# Patient Record
Sex: FEMALE | Race: WHITE | NOT HISPANIC OR LATINO | Employment: UNEMPLOYED | URBAN - NONMETROPOLITAN AREA
[De-identification: names, ages, dates, MRNs, and addresses within clinical notes are randomized per-mention and may not be internally consistent; named-entity substitution may affect disease eponyms.]

---

## 2017-10-05 ENCOUNTER — HOSPITAL ENCOUNTER (EMERGENCY)
Facility: HOSPITAL | Age: 37
Discharge: STILL ADMITTED OR ADMINISTRATIVE | End: 2017-10-06
Attending: EMERGENCY MEDICINE

## 2017-10-05 VITALS
OXYGEN SATURATION: 100 % | DIASTOLIC BLOOD PRESSURE: 84 MMHG | HEART RATE: 82 BPM | BODY MASS INDEX: 25.61 KG/M2 | TEMPERATURE: 98.1 F | HEIGHT: 64 IN | RESPIRATION RATE: 18 BRPM | WEIGHT: 150 LBS | SYSTOLIC BLOOD PRESSURE: 126 MMHG

## 2017-10-05 DIAGNOSIS — F19.10 SUBSTANCE ABUSE (HCC): Primary | ICD-10-CM

## 2017-10-06 ENCOUNTER — HOSPITAL ENCOUNTER (INPATIENT)
Facility: HOSPITAL | Age: 37
LOS: 5 days | Discharge: HOME | End: 2017-10-11
Attending: PSYCHIATRY & NEUROLOGY | Admitting: PSYCHIATRY & NEUROLOGY

## 2017-10-06 PROBLEM — F43.10 POST TRAUMATIC STRESS DISORDER (PTSD): Status: ACTIVE | Noted: 2017-10-06

## 2017-10-06 PROBLEM — F13.20 SEDATIVE, HYPNOTIC, OR ANXIOLYTIC DEPENDENCE (HCC): Status: ACTIVE | Noted: 2017-10-06

## 2017-10-06 PROBLEM — F15.20 METHAMPHETAMINE DEPENDENCE (HCC): Status: ACTIVE | Noted: 2017-10-06

## 2017-10-06 PROBLEM — F19.20 ADDICTION TO DRUG (HCC): Status: ACTIVE | Noted: 2017-10-06

## 2017-10-06 PROBLEM — F11.20 OPIOID TYPE DEPENDENCE, CONTINUOUS (HCC): Status: ACTIVE | Noted: 2017-10-06

## 2017-10-06 PROBLEM — F17.210 DEPENDENCE ON NICOTINE FROM CIGARETTES: Status: ACTIVE | Noted: 2017-10-06

## 2017-10-06 LAB
6-ACETYL MORPHINE: NEGATIVE
ALBUMIN SERPL-MCNC: 3.7 G/DL (ref 3.5–5)
ALBUMIN/GLOB SERPL: 1.2 G/DL (ref 1.5–2.5)
ALP SERPL-CCNC: 82 U/L (ref 35–104)
ALT SERPL W P-5'-P-CCNC: 9 U/L (ref 10–36)
AMPHET+METHAMPHET UR QL: POSITIVE
ANION GAP SERPL CALCULATED.3IONS-SCNC: 5.2 MMOL/L (ref 3.6–11.2)
AST SERPL-CCNC: 16 U/L (ref 10–30)
B-HCG UR QL: NEGATIVE
BACTERIA UR QL AUTO: ABNORMAL /HPF
BARBITURATES UR QL SCN: NEGATIVE
BASOPHILS # BLD AUTO: 0.03 10*3/MM3 (ref 0–0.3)
BASOPHILS NFR BLD AUTO: 0.2 % (ref 0–2)
BENZODIAZ UR QL SCN: NEGATIVE
BILIRUB SERPL-MCNC: 0.1 MG/DL (ref 0.2–1.8)
BILIRUB UR QL STRIP: NEGATIVE
BUN BLD-MCNC: 10 MG/DL (ref 7–21)
BUN/CREAT SERPL: 13.9 (ref 7–25)
BUPRENORPHINE SERPL-MCNC: POSITIVE NG/ML
CALCIUM SPEC-SCNC: 9.2 MG/DL (ref 7.7–10)
CANNABINOIDS SERPL QL: NEGATIVE
CHLORIDE SERPL-SCNC: 106 MMOL/L (ref 99–112)
CLARITY UR: ABNORMAL
CO2 SERPL-SCNC: 29.8 MMOL/L (ref 24.3–31.9)
COCAINE UR QL: NEGATIVE
COLOR UR: YELLOW
CREAT BLD-MCNC: 0.72 MG/DL (ref 0.43–1.29)
DEPRECATED RDW RBC AUTO: 48.2 FL (ref 37–54)
EOSINOPHIL # BLD AUTO: 0.29 10*3/MM3 (ref 0–0.7)
EOSINOPHIL NFR BLD AUTO: 2.1 % (ref 0–5)
ERYTHROCYTE [DISTWIDTH] IN BLOOD BY AUTOMATED COUNT: 14.4 % (ref 11.5–14.5)
ETHANOL BLD-MCNC: <10 MG/DL
ETHANOL UR QL: <0.01 %
GFR SERPL CREATININE-BSD FRML MDRD: 92 ML/MIN/1.73
GLOBULIN UR ELPH-MCNC: 3.1 GM/DL
GLUCOSE BLD-MCNC: 83 MG/DL (ref 70–110)
GLUCOSE UR STRIP-MCNC: NEGATIVE MG/DL
HAV IGM SERPL QL IA: ABNORMAL
HBV CORE IGM SERPL QL IA: ABNORMAL
HBV SURFACE AG SERPL QL IA: ABNORMAL
HCT VFR BLD AUTO: 37.9 % (ref 37–47)
HCV AB SER DONR QL: REACTIVE
HGB BLD-MCNC: 12.2 G/DL (ref 12–16)
HGB UR QL STRIP.AUTO: ABNORMAL
HIV1+2 AB SER QL: NORMAL
HYALINE CASTS UR QL AUTO: ABNORMAL /LPF
IMM GRANULOCYTES # BLD: 0.02 10*3/MM3 (ref 0–0.03)
IMM GRANULOCYTES NFR BLD: 0.1 % (ref 0–0.5)
KETONES UR QL STRIP: NEGATIVE
LEUKOCYTE ESTERASE UR QL STRIP.AUTO: ABNORMAL
LYMPHOCYTES # BLD AUTO: 4.29 10*3/MM3 (ref 1–3)
LYMPHOCYTES NFR BLD AUTO: 30.3 % (ref 21–51)
MCH RBC QN AUTO: 30.7 PG (ref 27–33)
MCHC RBC AUTO-ENTMCNC: 32.2 G/DL (ref 33–37)
MCV RBC AUTO: 95.5 FL (ref 80–94)
METHADONE UR QL SCN: NEGATIVE
MONOCYTES # BLD AUTO: 1.05 10*3/MM3 (ref 0.1–0.9)
MONOCYTES NFR BLD AUTO: 7.4 % (ref 0–10)
NEUTROPHILS # BLD AUTO: 8.46 10*3/MM3 (ref 1.4–6.5)
NEUTROPHILS NFR BLD AUTO: 59.9 % (ref 30–70)
NITRITE UR QL STRIP: NEGATIVE
OPIATES UR QL: NEGATIVE
OSMOLALITY SERPL CALC.SUM OF ELEC: 279.4 MOSM/KG (ref 273–305)
OXYCODONE UR QL SCN: NEGATIVE
PCP UR QL SCN: NEGATIVE
PH UR STRIP.AUTO: 6 [PH] (ref 5–8)
PLATELET # BLD AUTO: 351 10*3/MM3 (ref 130–400)
PMV BLD AUTO: 9.3 FL (ref 6–10)
POTASSIUM BLD-SCNC: 3.7 MMOL/L (ref 3.5–5.3)
PROT SERPL-MCNC: 6.8 G/DL (ref 6–8)
PROT UR QL STRIP: ABNORMAL
RBC # BLD AUTO: 3.97 10*6/MM3 (ref 4.2–5.4)
RBC # UR: ABNORMAL /HPF
REF LAB TEST METHOD: ABNORMAL
SODIUM BLD-SCNC: 141 MMOL/L (ref 135–153)
SP GR UR STRIP: 1.02 (ref 1–1.03)
SQUAMOUS #/AREA URNS HPF: ABNORMAL /HPF
UROBILINOGEN UR QL STRIP: ABNORMAL
WBC NRBC COR # BLD: 14.14 10*3/MM3 (ref 4.5–12.5)
WBC UR QL AUTO: ABNORMAL /HPF

## 2017-10-06 PROCEDURE — 80307 DRUG TEST PRSMV CHEM ANLYZR: CPT | Performed by: PHYSICIAN ASSISTANT

## 2017-10-06 PROCEDURE — 99223 1ST HOSP IP/OBS HIGH 75: CPT | Performed by: PSYCHIATRY & NEUROLOGY

## 2017-10-06 PROCEDURE — 87147 CULTURE TYPE IMMUNOLOGIC: CPT | Performed by: PHYSICIAN ASSISTANT

## 2017-10-06 PROCEDURE — G0432 EIA HIV-1/HIV-2 SCREEN: HCPCS | Performed by: PHYSICIAN ASSISTANT

## 2017-10-06 PROCEDURE — 87077 CULTURE AEROBIC IDENTIFY: CPT | Performed by: PHYSICIAN ASSISTANT

## 2017-10-06 PROCEDURE — 81001 URINALYSIS AUTO W/SCOPE: CPT | Performed by: PHYSICIAN ASSISTANT

## 2017-10-06 PROCEDURE — 80074 ACUTE HEPATITIS PANEL: CPT | Performed by: PHYSICIAN ASSISTANT

## 2017-10-06 PROCEDURE — 81025 URINE PREGNANCY TEST: CPT | Performed by: PHYSICIAN ASSISTANT

## 2017-10-06 PROCEDURE — 87186 SC STD MICRODIL/AGAR DIL: CPT | Performed by: PHYSICIAN ASSISTANT

## 2017-10-06 PROCEDURE — HZ2ZZZZ DETOXIFICATION SERVICES FOR SUBSTANCE ABUSE TREATMENT: ICD-10-PCS | Performed by: PSYCHIATRY & NEUROLOGY

## 2017-10-06 PROCEDURE — 85025 COMPLETE CBC W/AUTO DIFF WBC: CPT | Performed by: PHYSICIAN ASSISTANT

## 2017-10-06 PROCEDURE — 80053 COMPREHEN METABOLIC PANEL: CPT | Performed by: PHYSICIAN ASSISTANT

## 2017-10-06 PROCEDURE — 93005 ELECTROCARDIOGRAM TRACING: CPT | Performed by: PSYCHIATRY & NEUROLOGY

## 2017-10-06 PROCEDURE — 87086 URINE CULTURE/COLONY COUNT: CPT | Performed by: PHYSICIAN ASSISTANT

## 2017-10-06 RX ORDER — CLONIDINE HYDROCHLORIDE 0.1 MG/1
0.1 TABLET ORAL 4 TIMES DAILY PRN
Status: DISCONTINUED | OUTPATIENT
Start: 2017-10-07 | End: 2017-10-07

## 2017-10-06 RX ORDER — BENZTROPINE MESYLATE 1 MG/1
1 TABLET ORAL DAILY PRN
Status: DISCONTINUED | OUTPATIENT
Start: 2017-10-06 | End: 2017-10-11 | Stop reason: HOSPADM

## 2017-10-06 RX ORDER — NITROFURANTOIN 25; 75 MG/1; MG/1
100 CAPSULE ORAL EVERY 12 HOURS SCHEDULED
Status: COMPLETED | OUTPATIENT
Start: 2017-10-06 | End: 2017-10-10

## 2017-10-06 RX ORDER — DICYCLOMINE HYDROCHLORIDE 10 MG/1
10 CAPSULE ORAL 3 TIMES DAILY PRN
Status: DISPENSED | OUTPATIENT
Start: 2017-10-06 | End: 2017-10-11

## 2017-10-06 RX ORDER — CYCLOBENZAPRINE HCL 10 MG
10 TABLET ORAL 3 TIMES DAILY PRN
Status: DISPENSED | OUTPATIENT
Start: 2017-10-06 | End: 2017-10-11

## 2017-10-06 RX ORDER — FAMOTIDINE 20 MG/1
20 TABLET, FILM COATED ORAL 2 TIMES DAILY PRN
Status: DISCONTINUED | OUTPATIENT
Start: 2017-10-06 | End: 2017-10-11 | Stop reason: HOSPADM

## 2017-10-06 RX ORDER — LORAZEPAM 1 MG/1
1 TABLET ORAL EVERY 4 HOURS PRN
Status: ACTIVE | OUTPATIENT
Start: 2017-10-09 | End: 2017-10-10

## 2017-10-06 RX ORDER — LORAZEPAM 2 MG/1
2 TABLET ORAL EVERY 4 HOURS PRN
Status: ACTIVE | OUTPATIENT
Start: 2017-10-07 | End: 2017-10-08

## 2017-10-06 RX ORDER — ONDANSETRON 4 MG/1
4 TABLET, FILM COATED ORAL EVERY 6 HOURS PRN
Status: DISCONTINUED | OUTPATIENT
Start: 2017-10-06 | End: 2017-10-11 | Stop reason: HOSPADM

## 2017-10-06 RX ORDER — HYDROXYZINE 50 MG/1
50 TABLET, FILM COATED ORAL EVERY 6 HOURS PRN
Status: DISCONTINUED | OUTPATIENT
Start: 2017-10-06 | End: 2017-10-11 | Stop reason: HOSPADM

## 2017-10-06 RX ORDER — LORAZEPAM 0.5 MG/1
0.5 TABLET ORAL
Status: COMPLETED | OUTPATIENT
Start: 2017-10-10 | End: 2017-10-10

## 2017-10-06 RX ORDER — ACETAMINOPHEN 325 MG/1
650 TABLET ORAL EVERY 4 HOURS PRN
Status: DISCONTINUED | OUTPATIENT
Start: 2017-10-06 | End: 2017-10-11 | Stop reason: HOSPADM

## 2017-10-06 RX ORDER — CLONIDINE HYDROCHLORIDE 0.1 MG/1
0.1 TABLET ORAL 3 TIMES DAILY PRN
Status: DISCONTINUED | OUTPATIENT
Start: 2017-10-08 | End: 2017-10-07

## 2017-10-06 RX ORDER — CLONIDINE HYDROCHLORIDE 0.1 MG/1
0.1 TABLET ORAL 4 TIMES DAILY PRN
Status: ACTIVE | OUTPATIENT
Start: 2017-10-06 | End: 2017-10-07

## 2017-10-06 RX ORDER — MULTIVITAMIN
1 TABLET ORAL DAILY
Status: DISCONTINUED | OUTPATIENT
Start: 2017-10-06 | End: 2017-10-11 | Stop reason: HOSPADM

## 2017-10-06 RX ORDER — CLONIDINE HYDROCHLORIDE 0.1 MG/1
0.1 TABLET ORAL ONCE AS NEEDED
Status: DISCONTINUED | OUTPATIENT
Start: 2017-10-10 | End: 2017-10-07

## 2017-10-06 RX ORDER — SULFAMETHOXAZOLE AND TRIMETHOPRIM 800; 160 MG/1; MG/1
1 TABLET ORAL ONCE
Status: COMPLETED | OUTPATIENT
Start: 2017-10-06 | End: 2017-10-06

## 2017-10-06 RX ORDER — LORAZEPAM 0.5 MG/1
0.5 TABLET ORAL EVERY 4 HOURS PRN
Status: ACTIVE | OUTPATIENT
Start: 2017-10-10 | End: 2017-10-11

## 2017-10-06 RX ORDER — CLONIDINE HYDROCHLORIDE 0.1 MG/1
0.1 TABLET ORAL 2 TIMES DAILY PRN
Status: DISCONTINUED | OUTPATIENT
Start: 2017-10-09 | End: 2017-10-07

## 2017-10-06 RX ORDER — ECHINACEA PURPUREA EXTRACT 125 MG
2 TABLET ORAL AS NEEDED
Status: DISCONTINUED | OUTPATIENT
Start: 2017-10-06 | End: 2017-10-11 | Stop reason: HOSPADM

## 2017-10-06 RX ORDER — BENZTROPINE MESYLATE 1 MG/ML
0.5 INJECTION INTRAMUSCULAR; INTRAVENOUS DAILY PRN
Status: DISCONTINUED | OUTPATIENT
Start: 2017-10-06 | End: 2017-10-11 | Stop reason: HOSPADM

## 2017-10-06 RX ORDER — LORAZEPAM 1 MG/1
1 TABLET ORAL
Status: COMPLETED | OUTPATIENT
Start: 2017-10-09 | End: 2017-10-09

## 2017-10-06 RX ORDER — TRAZODONE HYDROCHLORIDE 50 MG/1
50 TABLET ORAL NIGHTLY PRN
Status: DISCONTINUED | OUTPATIENT
Start: 2017-10-06 | End: 2017-10-11 | Stop reason: HOSPADM

## 2017-10-06 RX ORDER — ALUMINA, MAGNESIA, AND SIMETHICONE 2400; 2400; 240 MG/30ML; MG/30ML; MG/30ML
15 SUSPENSION ORAL EVERY 6 HOURS PRN
Status: DISCONTINUED | OUTPATIENT
Start: 2017-10-06 | End: 2017-10-11 | Stop reason: HOSPADM

## 2017-10-06 RX ORDER — LORAZEPAM 2 MG/1
2 TABLET ORAL
Status: ACTIVE | OUTPATIENT
Start: 2017-10-06 | End: 2017-10-07

## 2017-10-06 RX ORDER — NICOTINE 21 MG/24HR
1 PATCH, TRANSDERMAL 24 HOURS TRANSDERMAL EVERY 24 HOURS
Status: DISCONTINUED | OUTPATIENT
Start: 2017-10-06 | End: 2017-10-11 | Stop reason: HOSPADM

## 2017-10-06 RX ORDER — BENZONATATE 100 MG/1
100 CAPSULE ORAL 3 TIMES DAILY PRN
Status: DISCONTINUED | OUTPATIENT
Start: 2017-10-06 | End: 2017-10-11 | Stop reason: HOSPADM

## 2017-10-06 RX ORDER — LOPERAMIDE HYDROCHLORIDE 2 MG/1
2 CAPSULE ORAL 4 TIMES DAILY PRN
Status: DISCONTINUED | OUTPATIENT
Start: 2017-10-06 | End: 2017-10-11 | Stop reason: HOSPADM

## 2017-10-06 RX ORDER — LORAZEPAM 2 MG/1
2 TABLET ORAL
Status: COMPLETED | OUTPATIENT
Start: 2017-10-07 | End: 2017-10-07

## 2017-10-06 RX ADMIN — TRAZODONE HYDROCHLORIDE 50 MG: 50 TABLET ORAL at 22:30

## 2017-10-06 RX ADMIN — NITROFURANTOIN MONOHYDRATE/MACROCRYSTALLINE 100 MG: 25; 75 CAPSULE ORAL at 14:00

## 2017-10-06 RX ADMIN — NITROFURANTOIN MONOHYDRATE/MACROCRYSTALLINE 100 MG: 25; 75 CAPSULE ORAL at 22:30

## 2017-10-06 RX ADMIN — Medication 1 TABLET: at 08:45

## 2017-10-06 RX ADMIN — CYCLOBENZAPRINE HYDROCHLORIDE 10 MG: 10 TABLET, FILM COATED ORAL at 22:30

## 2017-10-06 RX ADMIN — HYDROXYZINE HYDROCHLORIDE 50 MG: 50 TABLET ORAL at 22:30

## 2017-10-06 RX ADMIN — ACETAMINOPHEN 650 MG: 325 TABLET ORAL at 02:11

## 2017-10-06 RX ADMIN — SULFAMETHOXAZOLE AND TRIMETHOPRIM 160 MG: 800; 160 TABLET ORAL at 01:27

## 2017-10-06 NOTE — PLAN OF CARE
Problem: BH Patient Care Overview (Adult)  Goal: Plan of Care Review  Outcome: Ongoing (interventions implemented as appropriate)    10/06/17 0336   Coping/Psychosocial Response Interventions   Plan Of Care Reviewed With patient   Coping/Psychosocial   Patient Agreement with Plan of Care agrees   Patient Care Overview   Progress no change   Outcome Evaluation   Outcome Summary/Follow up Plan New admit to the facility
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
## 2017-10-06 NOTE — PLAN OF CARE
Problem: BH Patient Care Overview (Adult)  Goal: Plan of Care Review  Outcome: Ongoing (interventions implemented as appropriate)    10/06/17 4947   Coping/Psychosocial Response Interventions   Plan Of Care Reviewed With patient   Coping/Psychosocial   Patient Agreement with Plan of Care agrees   Patient Care Overview   Progress no change   Outcome Evaluation   Outcome Summary/Follow up Plan pt staying in bed a lot.
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
## 2017-10-06 NOTE — DISCHARGE INSTR - APPOINTMENTS
[address] [address]  [phone]    October 16th, 2017 @ 1:45am     Additional Community Resources:    [facility]   [facility] [address] [place] [address] [address] [place] [id number] [phone]  Mondays at 6:00PM  (meals and childcare provided)    Local NA Meeting  [facility]   [address] [place] [address]  Fridays at 7:00PM

## 2017-10-06 NOTE — ED PROVIDER NOTES
Subjective   Patient is a 37 y.o. female presenting with mental health disorder.   History provided by:  Patient  Mental Health Problem   Presenting symptoms: depression    Degree of incapacity (severity):  Moderate  Onset quality:  Gradual  Timing:  Constant  Progression:  Worsening  Chronicity:  Recurrent  Context: drug abuse    Treatment compliance:  Some of the time  Relieved by:  Nothing  Associated symptoms: anxiety, appetite change, feelings of worthlessness and poor judgment    Associated symptoms: no abdominal pain, no anhedonia and no chest pain        Review of Systems   Constitutional: Positive for appetite change. Negative for fever.   HENT: Negative.    Respiratory: Negative.    Cardiovascular: Negative.  Negative for chest pain.   Gastrointestinal: Negative.  Negative for abdominal pain.   Endocrine: Negative.    Genitourinary: Negative.  Negative for dysuria.   Skin: Negative.    Neurological: Negative.    Psychiatric/Behavioral: The patient is nervous/anxious.    All other systems reviewed and are negative.      Past Medical History:   Diagnosis Date   • Withdrawal symptoms, drug or narcotic        No Known Allergies    Past Surgical History:   Procedure Laterality Date   • APPENDECTOMY     •  SECTION         History reviewed. No pertinent family history.    Social History     Social History   • Marital status: Single     Spouse name: N/A   • Number of children: N/A   • Years of education: N/A     Social History Main Topics   • Smoking status: Current Some Day Smoker     Packs/day: 1.50   • Smokeless tobacco: Never Used   • Alcohol use No   • Drug use: Yes     Special: Methamphetamines   • Sexual activity: Defer     Other Topics Concern   • None     Social History Narrative   • None           Objective   Physical Exam   Constitutional: She is oriented to person, place, and time. She appears well-developed and well-nourished. No distress.   HENT:   Head: Normocephalic and atraumatic.   Nose:  Nose normal.   Eyes: Conjunctivae and EOM are normal. Pupils are equal, round, and reactive to light.   Neck: Normal range of motion. Neck supple. No JVD present. No tracheal deviation present.   Cardiovascular: Normal rate, regular rhythm and normal heart sounds.    No murmur heard.  Pulmonary/Chest: Effort normal and breath sounds normal. No respiratory distress. She has no wheezes.   Abdominal: Soft. Bowel sounds are normal. There is no tenderness.   Musculoskeletal: Normal range of motion. She exhibits no edema or deformity.   Neurological: She is alert and oriented to person, place, and time. No cranial nerve deficit.   Skin: Skin is warm and dry. No rash noted. She is not diaphoretic. No erythema. No pallor.   Nursing note and vitals reviewed.      Procedures         ED Course  ED Course                  MDM  Number of Diagnoses or Management Options  established and worsening     Amount and/or Complexity of Data Reviewed  Clinical lab tests: ordered and reviewed    Risk of Complications, Morbidity, and/or Mortality  Presenting problems: moderate  Diagnostic procedures: moderate  Management options: moderate    Patient Progress  Patient progress: stable      Final diagnoses:   Substance abuse            GERALD Hutchins  10/06/17 0137

## 2017-10-07 PROBLEM — N30.00 ACUTE CYSTITIS WITHOUT HEMATURIA: Status: ACTIVE | Noted: 2017-10-07

## 2017-10-07 PROBLEM — F11.23 OPIOID DEPENDENCE WITH WITHDRAWAL (HCC): Status: ACTIVE | Noted: 2017-10-06

## 2017-10-07 PROCEDURE — 99232 SBSQ HOSP IP/OBS MODERATE 35: CPT | Performed by: PSYCHIATRY & NEUROLOGY

## 2017-10-07 RX ORDER — BUPRENORPHINE 2 MG/1
4 TABLET SUBLINGUAL ONCE
Status: COMPLETED | OUTPATIENT
Start: 2017-10-07 | End: 2017-10-07

## 2017-10-07 RX ORDER — BUPRENORPHINE 2 MG/1
2 TABLET SUBLINGUAL EVERY 12 HOURS
Status: COMPLETED | OUTPATIENT
Start: 2017-10-08 | End: 2017-10-09

## 2017-10-07 RX ADMIN — ACETAMINOPHEN 650 MG: 325 TABLET ORAL at 09:14

## 2017-10-07 RX ADMIN — LORAZEPAM 2 MG: 2 TABLET ORAL at 21:49

## 2017-10-07 RX ADMIN — BUPRENORPHINE HCL 4 MG: 2 TABLET SUBLINGUAL at 14:01

## 2017-10-07 RX ADMIN — NITROFURANTOIN MONOHYDRATE/MACROCRYSTALLINE 100 MG: 25; 75 CAPSULE ORAL at 21:49

## 2017-10-07 RX ADMIN — LORAZEPAM 2 MG: 2 TABLET ORAL at 14:01

## 2017-10-07 RX ADMIN — LORAZEPAM 2 MG: 2 TABLET ORAL at 09:14

## 2017-10-07 RX ADMIN — TRAZODONE HYDROCHLORIDE 50 MG: 50 TABLET ORAL at 21:51

## 2017-10-07 RX ADMIN — NITROFURANTOIN MONOHYDRATE/MACROCRYSTALLINE 100 MG: 25; 75 CAPSULE ORAL at 09:13

## 2017-10-07 RX ADMIN — Medication 1 TABLET: at 09:13

## 2017-10-07 NOTE — PLAN OF CARE
Problem:  Patient Care Overview (Adult)  Goal: Plan of Care Review  Outcome: Ongoing (interventions implemented as appropriate)  Patient has been napping most of the day because she only slept only a few hours last night because her legs were aching.  Cravings 9 for suboxone, anxiety 10, depression 7, having some tremors, headaches and a lot of restlessness noted.  Denied any S/I, H/I & A/V/H ideations.    10/07/17 4589   Coping/Psychosocial Response Interventions   Plan Of Care Reviewed With patient   Coping/Psychosocial   Patient Agreement with Plan of Care agrees   Patient Care Overview   Progress improving

## 2017-10-07 NOTE — PROGRESS NOTES
"INPATIENT PSYCHIATRIC PROGRESS NOTE    Name:  Joy Hernandez  :  1980  MRN:  [id number]  Visit Number:  [id number]  Length of stay:  1    SUBJECTIVE  CC: polysubstance withdrawal    INTERVAL HISTORY:  The patient reports that she feels like \"shit.\"  She says she has been using meth to come off of the Xanax.  She also has been using Suboxone.  Today, she has significant opiate withdrawal symptoms.  She denies any suicidal or homicidal thoughts.  She denied any perceptual disturbances including auditory or visual hallucinations and no tactile hallucinations.  Depression rating 6/10  Anxiety rating 10/10  Sleep: poor, restless  Withdrawal sx: see ROS below  Craving: 10/10, for \"suboxone\"      Review of Systems   Constitutional: Positive for chills, diaphoresis and fatigue.   Respiratory: Negative.    Cardiovascular: Negative.         C/o \"rib pain\" on right   Gastrointestinal: Positive for diarrhea and nausea. Negative for vomiting.   Musculoskeletal: Positive for myalgias.   Neurological: Positive for headaches.       OBJECTIVE    Temp:  [98.4 °F (36.9 °C)-99.8 °F (37.7 °C)] 98.4 °F (36.9 °C)  Heart Rate:  [85-94] 88  Resp:  [18] 18  BP: (106-135)/(61-96) 135/96    MENTAL STATUS EXAM:  Appearance: Disheveled, appeared in mild distress  Cooperation:Cooperative, poor eye contact  Psychomotor: No psychomotor agitation/retardation, No EPS, No motor tics  Speech-normal rate, amount.  Mood \"terrible\"   Affect-congruent, appropriate, stable  Thought Content-goal directed, no delusional material present  Thought process-linear, organized.  Suicidality: No SI  Homicidality: No HI  Perception: No AH/VH  Insight- poor   Judgement-fair    Lab Results (last 24 hours)     ** No results found for the last 24 hours. **             Imaging Results (last 24 hours)     ** No results found for the last 24 hours. **             ECG/EMG Results (most recent)     Procedure Component Value Units Date/Time    ECG 12 Lead [[id number]] " Collected:  10/06/17 0443     Updated:  10/06/17 1736    Narrative:       Test Reason : ROUTINE  Blood Pressure : **/** mmHG  Vent. Rate : 089 BPM     Atrial Rate : 089 BPM     P-R Int : 150 ms          QRS Dur : 082 ms      QT Int : 392 ms       P-R-T Axes : 050 076 065 degrees     QTc Int : 476 ms    Normal sinus rhythm  Possible Left atrial enlargement  Borderline ECG  No previous ECGs available  Confirmed by Danae Cervantes ([id number]) on 10/6/2017 5:36:52 PM    Referred By:  ANANYA           Confirmed By:Danae Cervantes           ALLERGIES: Review of patient's allergies indicates no known allergies.      Current Facility-Administered Medications:   •  acetaminophen (TYLENOL) tablet 650 mg, 650 mg, Oral, Q4H PRN, Raquel Manzano MD, 650 mg at 10/07/17 0914  •  aluminum-magnesium hydroxide-simethicone (MAALOX MAX) 400-400-40 MG/5ML suspension 15 mL, 15 mL, Oral, Q6H PRN, Raquel Manzano MD  •  benzonatate (TESSALON) capsule 100 mg, 100 mg, Oral, TID PRN, Raquel Manzano MD  •  benztropine (COGENTIN) tablet 1 mg, 1 mg, Oral, Daily PRN **OR** benztropine (COGENTIN) injection 0.5 mg, 0.5 mg, Intramuscular, Daily PRN, Raquel Manzano MD  •  [START ON 10/8/2017] buprenorphine (SUBUTEX) SL tablet 2 mg, 2 mg, Sublingual, Q12H, Taqueria Bolden MD  •  buprenorphine (SUBUTEX) SL tablet 4 mg, 4 mg, Sublingual, Once, Taqueria Bolden MD  •  cyclobenzaprine (FLEXERIL) tablet 10 mg, 10 mg, Oral, TID PRN, Raquel Manzano MD, 10 mg at 10/06/17 2230  •  dicyclomine (BENTYL) capsule 10 mg, 10 mg, Oral, TID PRN, Raquel Manzano MD  •  famotidine (PEPCID) tablet 20 mg, 20 mg, Oral, BID PRN, Raquel Manzano MD  •  hydrOXYzine (ATARAX) tablet 50 mg, 50 mg, Oral, Q6H PRN, Raquel Manzano MD, 50 mg at 10/06/17 2230  •  loperamide (IMODIUM) capsule 2 mg, 2 mg, Oral, 4x Daily PRN, Raquel Manzano MD  •  LORazepam (ATIVAN) tablet 2 mg, 2 mg, Oral, 3 times per day, 2 mg at 10/07/17 0914 **FOLLOWED BY** [START ON 10/8/2017] LORazepam (ATIVAN) tablet  1.5 mg, 1.5 mg, Oral, 3 times per day **FOLLOWED BY** [START ON 10/9/2017] LORazepam (ATIVAN) tablet 1 mg, 1 mg, Oral, 3 times per day **FOLLOWED BY** [START ON 10/10/2017] LORazepam (ATIVAN) tablet 0.5 mg, 0.5 mg, Oral, 3 times per day, Raquel Manzano MD  •  LORazepam (ATIVAN) tablet 2 mg, 2 mg, Oral, Q4H PRN **FOLLOWED BY** [START ON 10/8/2017] LORazepam (ATIVAN) tablet 1.5 mg, 1.5 mg, Oral, Q4H PRN **FOLLOWED BY** [START ON 10/9/2017] LORazepam (ATIVAN) tablet 1 mg, 1 mg, Oral, Q4H PRN **FOLLOWED BY** [START ON 10/10/2017] LORazepam (ATIVAN) tablet 0.5 mg, 0.5 mg, Oral, Q4H PRN, Raquel Manzano MD  •  magnesium hydroxide (MILK OF MAGNESIA) suspension 2400 mg/10mL 10 mL, 10 mL, Oral, Daily PRN, Raquel Manzano MD  •  multivitamin (DAILY ISIS) tablet 1 tablet, 1 tablet, Oral, Daily, Raquel Manzano MD, 1 tablet at 10/07/17 0913  •  nicotine (NICODERM CQ) 21 MG/24HR patch 1 patch, 1 patch, Transdermal, Q24H, Raquel Manzano MD  •  nitrofurantoin (macrocrystal-monohydrate) (MACROBID) capsule 100 mg, 100 mg, Oral, Q12H, Raquel Manzano MD, 100 mg at 10/07/17 0913  •  ondansetron (ZOFRAN) tablet 4 mg, 4 mg, Oral, Q6H PRN, Raquel Manzano MD  •  sodium chloride (OCEAN) nasal spray 2 spray, 2 spray, Each Nare, PRN, Raquel Manzano MD  •  traZODone (DESYREL) tablet 50 mg, 50 mg, Oral, Nightly PRN, Raquel Manzano MD, 50 mg at 10/06/17 2230    ASSESSMENT & PLAN:    Active Problems:    Opioid dependence with withdrawal  Plan: Start a short Subutex taper today.  Continue supportive treatment of withdrawal      Sedative, hypnotic, or anxiolytic dependence  Plan: Continue Ativan detox protocol.      Methamphetamine dependence  Plan: Monitor for withdrawal and treat supportively      Dependence on nicotine from cigarettes  Plan: Continue nicotine replacement      Post traumatic stress disorder (PTSD)  Plan: Continue monitoring and trazodone as needed for sleep      Acute cystitis without hematuria  Plan: Continue Macrobid and follow up on the  urine culture and sensitivity report        Behavioral Health Treatment Plan and Problem List: I have reviewed and approved the Behavioral Health Treatment Plan and Problem list.  The patient has had a chance to review and agrees with the treatment plan.     Clinician:  Taqueria Bolden MD  10/07/17  1:19 PM

## 2017-10-07 NOTE — PLAN OF CARE
Problem: BH Overarching Goals  Goal: Adheres to Safety Considerations for Self and Others  Outcome: Ongoing (interventions implemented as appropriate)  Goal: Optimized Coping Skills in Response to Life Stressors  Outcome: Ongoing (interventions implemented as appropriate)  Goal: Develops/Participates in Therapeutic [place] to Support Successful Transition  Outcome: Ongoing (interventions implemented as appropriate)

## 2017-10-08 LAB
BACTERIA SPEC AEROBE CULT: ABNORMAL
BACTERIA SPEC AEROBE CULT: ABNORMAL

## 2017-10-08 PROCEDURE — 99232 SBSQ HOSP IP/OBS MODERATE 35: CPT | Performed by: PSYCHIATRY & NEUROLOGY

## 2017-10-08 RX ORDER — ROPINIROLE 0.25 MG/1
0.25 TABLET, FILM COATED ORAL EVERY 8 HOURS SCHEDULED
Status: DISCONTINUED | OUTPATIENT
Start: 2017-10-08 | End: 2017-10-11 | Stop reason: HOSPADM

## 2017-10-08 RX ORDER — DOCUSATE SODIUM 100 MG/1
100 CAPSULE, LIQUID FILLED ORAL 2 TIMES DAILY PRN
Status: DISCONTINUED | OUTPATIENT
Start: 2017-10-08 | End: 2017-10-11 | Stop reason: HOSPADM

## 2017-10-08 RX ADMIN — BUPRENORPHINE HCL 2 MG: 2 TABLET SUBLINGUAL at 08:30

## 2017-10-08 RX ADMIN — LORAZEPAM 1.5 MG: 2 TABLET ORAL at 14:20

## 2017-10-08 RX ADMIN — NITROFURANTOIN MONOHYDRATE/MACROCRYSTALLINE 100 MG: 25; 75 CAPSULE ORAL at 20:31

## 2017-10-08 RX ADMIN — ROPINIROLE 0.25 MG: 0.25 TABLET ORAL at 21:57

## 2017-10-08 RX ADMIN — ONDANSETRON 4 MG: 4 TABLET, FILM COATED ORAL at 20:31

## 2017-10-08 RX ADMIN — BUPRENORPHINE HCL 2 MG: 2 TABLET SUBLINGUAL at 20:31

## 2017-10-08 RX ADMIN — LORAZEPAM 1.5 MG: 2 TABLET ORAL at 08:30

## 2017-10-08 RX ADMIN — NITROFURANTOIN MONOHYDRATE/MACROCRYSTALLINE 100 MG: 25; 75 CAPSULE ORAL at 08:30

## 2017-10-08 RX ADMIN — Medication 1 TABLET: at 08:30

## 2017-10-08 RX ADMIN — ACETAMINOPHEN 650 MG: 325 TABLET ORAL at 20:31

## 2017-10-08 RX ADMIN — LORAZEPAM 1.5 MG: 2 TABLET ORAL at 21:57

## 2017-10-08 RX ADMIN — ROPINIROLE 0.25 MG: 0.25 TABLET ORAL at 17:11

## 2017-10-08 NOTE — PROGRESS NOTES
"INPATIENT PSYCHIATRIC PROGRESS NOTE    Name:  Joy Hernandez  :  1980  MRN:  [id number]  Visit Number:  [id number]  Length of stay:  2    SUBJECTIVE  CC: polysubstance withdrawal    INTERVAL HISTORY:  Patient continues to report that she is doing poorly.  She has been isolating in her room.    She denied auditory or visual hallucinations.  Sleep: Intermittent  Withdrawal sx: see ROS below; it should be noted to day that she complained of constipation saying \"I haven't gone since I've been here.\"  whereas yesterday she complained of diarrhea  Craving: 10/10, for \"suboxone\"      Review of Systems   Constitutional: Positive for chills, diaphoresis and fatigue.   Respiratory: Negative.    Cardiovascular: Negative.    Gastrointestinal: Positive for diarrhea and nausea. Negative for vomiting.   Musculoskeletal: Positive for myalgias.   Neurological: Positive for headaches.       OBJECTIVE    Temp:  [98.4 °F (36.9 °C)-99.2 °F (37.3 °C)] 99.2 °F (37.3 °C)  Heart Rate:  [] 113  Resp:  [16-20] 16  BP: (118-125)/(71-82) 118/71    MENTAL STATUS EXAM:  Appearance: Disheveled,  but no longer appears to be in distress  Cooperation:Cooperative, poor eye contact  Psychomotor: No psychomotor agitation/retardation, No EPS, No motor tics  Speech-normal rate, amount.  Mood \"terrible\"   Affect- constricted  Thought Content-goal directed, no delusional material present  Thought process-linear, organized.  Suicidality: No SI  Homicidality: No HI  Perception: No AH/VH  Insight- poor   Judgement-fair    Lab Results (last 24 hours)     ** No results found for the last 24 hours. **             Imaging Results (last 24 hours)     ** No results found for the last 24 hours. **             ECG/EMG Results (most recent)     Procedure Component Value Units Date/Time    ECG 12 Lead [[id number]] Collected:  10/06/17 0443     Updated:  10/06/17 391    Narrative:       Test Reason : ROUTINE  Blood Pressure : **/** mmHG  Vent. Rate : 089 " BPM     Atrial Rate : 089 BPM     P-R Int : 150 ms          QRS Dur : 082 ms      QT Int : 392 ms       P-R-T Axes : 050 076 065 degrees     QTc Int : 476 ms    Normal sinus rhythm  Possible Left atrial enlargement  Borderline ECG  No previous ECGs available  Confirmed by Danae Cervantes ([id number]) on 10/6/2017 5:36:52 PM    Referred By:  ANANYA           Confirmed By:Danae Cervantes           ALLERGIES: Review of patient's allergies indicates no known allergies.      Current Facility-Administered Medications:   •  acetaminophen (TYLENOL) tablet 650 mg, 650 mg, Oral, Q4H PRN, Raquel Manzano MD, 650 mg at 10/07/17 0914  •  aluminum-magnesium hydroxide-simethicone (MAALOX MAX) 400-400-40 MG/5ML suspension 15 mL, 15 mL, Oral, Q6H PRN, Raquel Manzano MD  •  benzonatate (TESSALON) capsule 100 mg, 100 mg, Oral, TID PRN, Raquel Manzano MD  •  benztropine (COGENTIN) tablet 1 mg, 1 mg, Oral, Daily PRN **OR** benztropine (COGENTIN) injection 0.5 mg, 0.5 mg, Intramuscular, Daily PRN, Raquel Manzano MD  •  buprenorphine (SUBUTEX) SL tablet 2 mg, 2 mg, Sublingual, Q12H, Taqueria Bolden MD, 2 mg at 10/08/17 0830  •  cyclobenzaprine (FLEXERIL) tablet 10 mg, 10 mg, Oral, TID PRN, Raquel Manzano MD, 10 mg at 10/06/17 2230  •  dicyclomine (BENTYL) capsule 10 mg, 10 mg, Oral, TID PRN, Raquel Manzano MD  •  famotidine (PEPCID) tablet 20 mg, 20 mg, Oral, BID PRN, Raquel Manzano MD  •  hydrOXYzine (ATARAX) tablet 50 mg, 50 mg, Oral, Q6H PRN, Raquel Manzano MD, 50 mg at 10/06/17 2230  •  loperamide (IMODIUM) capsule 2 mg, 2 mg, Oral, 4x Daily PRN, Raquel Manzano MD  •  [COMPLETED] LORazepam (ATIVAN) tablet 2 mg, 2 mg, Oral, 3 times per day, 2 mg at 10/07/17 2149 **FOLLOWED BY** LORazepam (ATIVAN) tablet 1.5 mg, 1.5 mg, Oral, 3 times per day, 1.5 mg at 10/08/17 1420 **FOLLOWED BY** [START ON 10/9/2017] LORazepam (ATIVAN) tablet 1 mg, 1 mg, Oral, 3 times per day **FOLLOWED BY** [START ON 10/10/2017] LORazepam (ATIVAN) tablet 0.5 mg, 0.5 mg, Oral,  3 times per day, Raquel Manzano MD  •  [] LORazepam (ATIVAN) tablet 2 mg, 2 mg, Oral, Q4H PRN **FOLLOWED BY** LORazepam (ATIVAN) tablet 1.5 mg, 1.5 mg, Oral, Q4H PRN **FOLLOWED BY** [START ON 10/9/2017] LORazepam (ATIVAN) tablet 1 mg, 1 mg, Oral, Q4H PRN **FOLLOWED BY** [START ON 10/10/2017] LORazepam (ATIVAN) tablet 0.5 mg, 0.5 mg, Oral, Q4H PRN, Raquel Manzano MD  •  magnesium hydroxide (MILK OF MAGNESIA) suspension 2400 mg/10mL 10 mL, 10 mL, Oral, Daily PRN, Raquel Manzano MD  •  multivitamin (DAILY ISIS) tablet 1 tablet, 1 tablet, Oral, Daily, Raquel Manzano MD, 1 tablet at 10/08/17 0830  •  nicotine (NICODERM CQ) 21 MG/24HR patch 1 patch, 1 patch, Transdermal, Q24H, Raquel Manzano MD  •  nitrofurantoin (macrocrystal-monohydrate) (MACROBID) capsule 100 mg, 100 mg, Oral, Q12H, Raquel Manzano MD, 100 mg at 10/08/17 0830  •  ondansetron (ZOFRAN) tablet 4 mg, 4 mg, Oral, Q6H PRN, Raquel Manzano MD  •  sodium chloride (OCEAN) nasal spray 2 spray, 2 spray, Each Nare, PRN, Raquel Manzano MD  •  traZODone (DESYREL) tablet 50 mg, 50 mg, Oral, Nightly PRN, Raquel Manzano MD, 50 mg at 10/07/17 2151    ASSESSMENT & PLAN:    Active Problems:    Opioid dependence with withdrawal  Plan: Continue Subutex taper today.  Continue supportive treatment of withdrawal.  We will begin Requip 0.25 mg 3 times a day to help with the leg restlessness.  I'm also adding Colace 100 mg twice daily as needed      Sedative, hypnotic, or anxiolytic dependence  Plan: Continue Ativan detox protocol.      Methamphetamine dependence  Plan: Monitor for withdrawal and treat supportively      Dependence on nicotine from cigarettes  Plan: Continue nicotine replacement      Post traumatic stress disorder (PTSD)  Plan: Continue monitoring and trazodone as needed for sleep      Acute cystitis without hematuria  Plan: Continue Macrobid         Behavioral Health Treatment Plan and Problem List: I have reviewed and approved the Behavioral Health Treatment Plan  and Problem list.  The patient has had a chance to review and agrees with the treatment plan.     Clinician:  Taqueria Bolden MD  10/08/17  2:58 PM

## 2017-10-08 NOTE — PLAN OF CARE
Problem: BH Patient Care Overview (Adult)  Goal: Plan of Care Review  Outcome: Ongoing (interventions implemented as appropriate)    10/08/17 1542   Coping/Psychosocial Response Interventions   Plan Of Care Reviewed With patient   Coping/Psychosocial   Patient Agreement with Plan of Care agrees   Patient Care Overview   Progress no change   Outcome Evaluation   Outcome Summary/Follow up Plan pt staying in bed a lot and somatic.

## 2017-10-08 NOTE — PLAN OF CARE
Problem: BH Patient Care Overview (Adult)  Goal: Plan of Care Review  Outcome: Ongoing (interventions implemented as appropriate)    10/08/17 0650   Coping/Psychosocial Response Interventions   Plan Of Care Reviewed With patient   Coping/Psychosocial   Patient Agreement with Plan of Care agrees   Patient Care Overview   Progress improving         Problem:  Overarching Goals  Goal: Adheres to Safety Considerations for Self and Others  Outcome: Ongoing (interventions implemented as appropriate)  Goal: Optimized Coping Skills in Response to Life Stressors  Outcome: Ongoing (interventions implemented as appropriate)  Goal: Develops/Participates in Therapeutic [place] to Support Successful Transition  Outcome: Ongoing (interventions implemented as appropriate)

## 2017-10-09 PROCEDURE — 99232 SBSQ HOSP IP/OBS MODERATE 35: CPT | Performed by: PSYCHIATRY & NEUROLOGY

## 2017-10-09 RX ADMIN — ROPINIROLE 0.25 MG: 0.25 TABLET ORAL at 13:40

## 2017-10-09 RX ADMIN — ROPINIROLE 0.25 MG: 0.25 TABLET ORAL at 05:39

## 2017-10-09 RX ADMIN — BUPRENORPHINE HCL 2 MG: 2 TABLET SUBLINGUAL at 08:11

## 2017-10-09 RX ADMIN — LORAZEPAM 1 MG: 1 TABLET ORAL at 14:45

## 2017-10-09 RX ADMIN — LORAZEPAM 1 MG: 1 TABLET ORAL at 08:11

## 2017-10-09 RX ADMIN — HYDROXYZINE HYDROCHLORIDE 50 MG: 50 TABLET ORAL at 21:50

## 2017-10-09 RX ADMIN — LORAZEPAM 1 MG: 1 TABLET ORAL at 21:49

## 2017-10-09 RX ADMIN — ROPINIROLE 0.25 MG: 0.25 TABLET ORAL at 21:49

## 2017-10-09 RX ADMIN — NITROFURANTOIN MONOHYDRATE/MACROCRYSTALLINE 100 MG: 25; 75 CAPSULE ORAL at 21:50

## 2017-10-09 RX ADMIN — NITROFURANTOIN MONOHYDRATE/MACROCRYSTALLINE 100 MG: 25; 75 CAPSULE ORAL at 08:12

## 2017-10-09 RX ADMIN — ONDANSETRON 4 MG: 4 TABLET, FILM COATED ORAL at 21:50

## 2017-10-09 RX ADMIN — CYCLOBENZAPRINE HYDROCHLORIDE 10 MG: 10 TABLET, FILM COATED ORAL at 21:50

## 2017-10-09 RX ADMIN — TRAZODONE HYDROCHLORIDE 50 MG: 50 TABLET ORAL at 21:50

## 2017-10-09 RX ADMIN — DICYCLOMINE HYDROCHLORIDE 10 MG: 10 CAPSULE ORAL at 21:50

## 2017-10-09 RX ADMIN — Medication 1 TABLET: at 08:11

## 2017-10-09 NOTE — PLAN OF CARE
Problem:  Patient Care Overview (Adult)  Goal: Plan of Care Review  Outcome: Ongoing (interventions implemented as appropriate)    10/06/17 1426 10/09/17 1441   Coping/Psychosocial Response Interventions   Plan Of Care Reviewed With --  patient   Coping/Psychosocial   Patient Agreement with Plan of Care --  agrees   Patient Care Overview   Consent Given to Review Plan with Mark CHEUNG  --    Progress --  improving   Outcome Evaluation   Outcome Summary/Follow up Plan --  Therapsit and Patient discussed current treatment and discharge concerns       [id number]     DATA:       Therapist met individually with patient this date. Reintroduced self to patient from initial assessment began Friday.  Discussed progress in treatment and any needs/concerns. Patient reported that she has not been feeling well today and has been isolating herself in her room. Patient discussed multiple physical discomfort. Patient inquired about obtaining medications at discharge. Therapist addressed this concern. Patient also asked about her next dose of Suboxone. Therapist informed Patient of her taper completion and would not be receiving an additional dose. Patient reported she understood. Patient then discussed worries about discharging today with a desire to remain in treatment to increase stability.      ASSESSMENT:   Patient observed to have an irritable affect and flat mood. Patient observed to be holding her stomach while bending inward in bed until her face was covered into her pillows. Patient appeared to keep eyes closed while communicating. Patient would shake her head for nonverbal communication at times rather than speaking. Patient rating depression and anxiety at 10/10.  Patient endorsing improved sleeping and appetite. Patient reported to remain feeling nauseated and vomited earlier today.           Plan:  Patient will follow-up with Mark CHEUNG at discharge.  Navigator will assist in the referral process in order to  obtain appropriate appointment and prevent future hospitalization and relapse.

## 2017-10-09 NOTE — PLAN OF CARE
Problem:  Patient Care Overview (Adult)  Goal: Plan of Care Review  Outcome: Ongoing (interventions implemented as appropriate)  Patient has been in and out of room but appears to be in pain or stiff joints.  Anxiety 10, depression 7, denied S/I, H/I & A/V/H ideations.  Cravings 10 for suboxone, having nausea, tremors, chills, body aches, feet hot, leg pains and runny nose.    10/09/17 9964   Coping/Psychosocial Response Interventions   Plan Of Care Reviewed With patient   Coping/Psychosocial   Patient Agreement with Plan of Care agrees   Patient Care Overview   Progress improving

## 2017-10-09 NOTE — PLAN OF CARE
Problem: BH Overarching Goals  Goal: Adheres to Safety Considerations for Self and Others  Outcome: Ongoing (interventions implemented as appropriate)  Goal: Optimized Coping Skills in Response to Life Stressors  Outcome: Ongoing (interventions implemented as appropriate)  Goal: Develops/Participates in Therapeutic [place] to Support Successful Transition  Outcome: Ongoing (interventions implemented as appropriate)

## 2017-10-09 NOTE — PROGRESS NOTES
"INPATIENT PSYCHIATRIC PROGRESS NOTE    Name:  Joy Hernandez  :  1980  MRN:  [id number]  Visit Number:  [id number]  Length of stay:  3    SUBJECTIVE  CC: polysubstance withdrawal    INTERVAL HISTORY:  Patient continues to report that she is feeling weak and is experiencing leg and stomach cramps.    Sleep: Intermittent  Craving: 10/10, for \"suboxone\"      Review of Systems   Constitutional: Positive for chills, diaphoresis and fatigue.   Respiratory: Negative.    Cardiovascular: Negative.    Gastrointestinal: Positive for constipation. Negative for vomiting.   Musculoskeletal: Positive for myalgias.   Neurological: Positive for headaches.       OBJECTIVE    Temp:  [97.5 °F (36.4 °C)-97.8 °F (36.6 °C)] 97.5 °F (36.4 °C)  Heart Rate:  [] 97  Resp:  [16-20] 18  BP: (100-124)/(63-83) 120/83    MENTAL STATUS EXAM:  Appearance: Disheveled,  but no longer appears to be in distress  Cooperation:Cooperative, poor eye contact  Psychomotor: No psychomotor agitation/retardation, No EPS, No motor tics  Speech-normal rate, amount.  Mood \"depressed\"   Affect- constricted  Thought Content-goal directed, no delusional material present  Thought process-linear, organized.  Suicidality: No SI  Homicidality: No HI  Perception: No AH/VH  Insight- poor   Judgement-fair    Lab Results (last 24 hours)     ** No results found for the last 24 hours. **             Imaging Results (last 24 hours)     ** No results found for the last 24 hours. **             ECG/EMG Results (most recent)     Procedure Component Value Units Date/Time    ECG 12 Lead [[id number]] Collected:  10/06/17 0443     Updated:  10/06/17 4891    Narrative:       Test Reason : ROUTINE  Blood Pressure : **/** mmHG  Vent. Rate : 089 BPM     Atrial Rate : 089 BPM     P-R Int : 150 ms          QRS Dur : 082 ms      QT Int : 392 ms       P-R-T Axes : 050 076 065 degrees     QTc Int : 476 ms    Normal sinus rhythm  Possible Left atrial enlargement  Borderline ECG  No " previous ECGs available  Confirmed by Danae Cervantes ([id number]) on 10/6/2017 5:36:52 PM    Referred By:  ANANYA           Confirmed By:Danae Cervantes           ALLERGIES: Review of patient's allergies indicates no known allergies.      Current Facility-Administered Medications:   •  acetaminophen (TYLENOL) tablet 650 mg, 650 mg, Oral, Q4H PRN, Raquel Manzano MD, 650 mg at 10/08/17 2031  •  aluminum-magnesium hydroxide-simethicone (MAALOX MAX) 400-400-40 MG/5ML suspension 15 mL, 15 mL, Oral, Q6H PRN, Raquel Manzano MD  •  benzonatate (TESSALON) capsule 100 mg, 100 mg, Oral, TID PRN, Raquel Manzano MD  •  benztropine (COGENTIN) tablet 1 mg, 1 mg, Oral, Daily PRN **OR** benztropine (COGENTIN) injection 0.5 mg, 0.5 mg, Intramuscular, Daily PRN, Raquel Manzano MD  •  cyclobenzaprine (FLEXERIL) tablet 10 mg, 10 mg, Oral, TID PRN, Raquel Manzano MD, 10 mg at 10/06/17 2230  •  dicyclomine (BENTYL) capsule 10 mg, 10 mg, Oral, TID PRN, Raquel Manzano MD  •  docusate sodium (COLACE) capsule 100 mg, 100 mg, Oral, BID PRN, Taqueria Bolden MD  •  famotidine (PEPCID) tablet 20 mg, 20 mg, Oral, BID PRN, Raquel Manzano MD  •  hydrOXYzine (ATARAX) tablet 50 mg, 50 mg, Oral, Q6H PRN, Raquel Manzano MD, 50 mg at 10/06/17 2230  •  loperamide (IMODIUM) capsule 2 mg, 2 mg, Oral, 4x Daily PRN, Raquel Manzano MD  •  [COMPLETED] LORazepam (ATIVAN) tablet 2 mg, 2 mg, Oral, 3 times per day, 2 mg at 10/07/17 2149 **FOLLOWED BY** [COMPLETED] LORazepam (ATIVAN) tablet 1.5 mg, 1.5 mg, Oral, 3 times per day, 1.5 mg at 10/08/17 2157 **FOLLOWED BY** LORazepam (ATIVAN) tablet 1 mg, 1 mg, Oral, 3 times per day, 1 mg at 10/09/17 1445 **FOLLOWED BY** [START ON 10/10/2017] LORazepam (ATIVAN) tablet 0.5 mg, 0.5 mg, Oral, 3 times per day, Raquel Manzano MD  •  [] LORazepam (ATIVAN) tablet 2 mg, 2 mg, Oral, Q4H PRN **FOLLOWED BY** [] LORazepam (ATIVAN) tablet 1.5 mg, 1.5 mg, Oral, Q4H PRN **FOLLOWED BY** LORazepam (ATIVAN) tablet 1 mg, 1 mg, Oral,  Q4H PRN **FOLLOWED BY** [START ON 10/10/2017] LORazepam (ATIVAN) tablet 0.5 mg, 0.5 mg, Oral, Q4H PRN, Raquel Manzano MD  •  magnesium hydroxide (MILK OF MAGNESIA) suspension 2400 mg/10mL 10 mL, 10 mL, Oral, Daily PRN, Raquel Manzano MD  •  multivitamin (DAILY ISIS) tablet 1 tablet, 1 tablet, Oral, Daily, Raquel Manzano MD, 1 tablet at 10/09/17 0811  •  nicotine (NICODERM CQ) 21 MG/24HR patch 1 patch, 1 patch, Transdermal, Q24H, Raquel Manzano MD  •  nitrofurantoin (macrocrystal-monohydrate) (MACROBID) capsule 100 mg, 100 mg, Oral, Q12H, Raquel Manzano MD, 100 mg at 10/09/17 0812  •  ondansetron (ZOFRAN) tablet 4 mg, 4 mg, Oral, Q6H PRN, Raquel Manzano MD, 4 mg at 10/08/17 2031  •  rOPINIRole (REQUIP) tablet 0.25 mg, 0.25 mg, Oral, Q8H, Taqueria Bolden MD, 0.25 mg at 10/09/17 1340  •  sodium chloride (OCEAN) nasal spray 2 spray, 2 spray, Each Nare, PRN, Raquel Manzano MD  •  traZODone (DESYREL) tablet 50 mg, 50 mg, Oral, Nightly PRN, Raquel Manzano MD, 50 mg at 10/07/17 2151    ASSESSMENT & PLAN:    Active Problems:    Opioid dependence with withdrawal  Plan: Continue supportive treatment of withdrawal.  We will begin Requip 0.25 mg 3 times a day to help with the leg restlessness. Colace 100 mg twice daily as needed      Sedative, hypnotic, or anxiolytic dependence  Plan: Continue Ativan detox protocol.      Methamphetamine dependence  Plan: Monitor for withdrawal and treat supportively      Dependence on nicotine from cigarettes  Plan: Continue nicotine replacement      Post traumatic stress disorder (PTSD)  Plan: Continue monitoring and trazodone as needed for sleep      Acute cystitis without hematuria  Plan: Continue Macrobid         Behavioral Health Treatment Plan and Problem List: I have reviewed and approved the Behavioral Health Treatment Plan and Problem list.  The patient has had a chance to review and agrees with the treatment plan.     Clinician:  Raquel Manzano MD  10/09/17  3:32 PM

## 2017-10-09 NOTE — PLAN OF CARE
Problem: BH Patient Care Overview (Adult)  Goal: Plan of Care Review  Outcome: Ongoing (interventions implemented as appropriate)    10/09/17 0333   Coping/Psychosocial Response Interventions   Plan Of Care Reviewed With patient   Coping/Psychosocial   Patient Agreement with Plan of Care agrees   Patient Care Overview   Progress no change   Outcome Evaluation   Outcome Summary/Follow up Plan Pt with multiple withdrawal symptoms. rates craving 10. Pt stayed in bed except to go smoke.

## 2017-10-10 PROCEDURE — 99232 SBSQ HOSP IP/OBS MODERATE 35: CPT | Performed by: PSYCHIATRY & NEUROLOGY

## 2017-10-10 RX ADMIN — ACETAMINOPHEN 650 MG: 325 TABLET ORAL at 09:10

## 2017-10-10 RX ADMIN — ROPINIROLE 0.25 MG: 0.25 TABLET ORAL at 06:10

## 2017-10-10 RX ADMIN — ONDANSETRON 4 MG: 4 TABLET, FILM COATED ORAL at 09:10

## 2017-10-10 RX ADMIN — NITROFURANTOIN MONOHYDRATE/MACROCRYSTALLINE 100 MG: 25; 75 CAPSULE ORAL at 22:00

## 2017-10-10 RX ADMIN — Medication 1 TABLET: at 09:10

## 2017-10-10 RX ADMIN — ROPINIROLE 0.25 MG: 0.25 TABLET ORAL at 14:53

## 2017-10-10 RX ADMIN — ROPINIROLE 0.25 MG: 0.25 TABLET ORAL at 22:04

## 2017-10-10 RX ADMIN — NITROFURANTOIN MONOHYDRATE/MACROCRYSTALLINE 100 MG: 25; 75 CAPSULE ORAL at 09:10

## 2017-10-10 RX ADMIN — HYDROXYZINE HYDROCHLORIDE 50 MG: 50 TABLET ORAL at 09:10

## 2017-10-10 RX ADMIN — LORAZEPAM 0.5 MG: 0.5 TABLET ORAL at 22:04

## 2017-10-10 RX ADMIN — LORAZEPAM 0.5 MG: 0.5 TABLET ORAL at 09:11

## 2017-10-10 RX ADMIN — TRAZODONE HYDROCHLORIDE 50 MG: 50 TABLET ORAL at 22:04

## 2017-10-10 RX ADMIN — DICYCLOMINE HYDROCHLORIDE 10 MG: 10 CAPSULE ORAL at 09:10

## 2017-10-10 RX ADMIN — CYCLOBENZAPRINE HYDROCHLORIDE 10 MG: 10 TABLET, FILM COATED ORAL at 09:10

## 2017-10-10 RX ADMIN — LORAZEPAM 0.5 MG: 0.5 TABLET ORAL at 14:53

## 2017-10-10 NOTE — PLAN OF CARE
"Problem:  Patient Care Overview (Adult)  Goal: Plan of Care Review  Outcome: Ongoing (interventions implemented as appropriate)    10/06/17 1426 10/10/17 1137   Coping/Psychosocial Response Interventions   Plan Of Care Reviewed With --  patient   Coping/Psychosocial   Patient Agreement with Plan of Care --  agrees   Patient Care Overview   Consent Given to Review Plan with [facility]  --    Progress --  improving   Outcome Evaluation   Outcome Summary/Follow up Plan --  Discussed current treatment progress and discharge concerns       [id number]     Data:  Therapist met with Patient individually today to discuss current treatment progress and discharge concerns. Patient reported to be \"alright\" today and discussed improved mood. Patient reported that she was previously concerned about completing her Suboxone taper but understood that she had \"to deal with\". Patient reported that she would be \"okay\" if she was discharged today and then inquired about [facility] in [place], [place]. Patient requested that Therapist provide contact information for [facility] in hopes to begin referral process. Therapist will provide Patient with requested information this date. Patient then reported that she was previously \"kicked out\" of their program \"a while ago\" but is hopeful to return there.      Assessment:  Patient appeared to be brighter today and more talkative. Patient was observed to be walking and talking in the unit's dayroom area rather than isolating herself in her bedroom. Patient reported that her cravings are \"not really there\" and rated anxiety a 10/10 and depression as 7/10. Patient did appear to be fixated on obtaining Suboxone. Patient appears to be more motivated in obtaining this medication rather than therapeutic services. Patient appeared to have a flat affect and anxious mood. Patient denied any SI/HI and hallucinations today.      Plan:  Patient will be scheduled an aftercare appointment with " Mark CHEUNG prior to discharge in hopes to prevent future hospitalizations and relapse.   BH Navigator will work alongside Therapist in order to complete referral process.   Patient will not need [facility] at discharge.

## 2017-10-10 NOTE — PROGRESS NOTES
"INPATIENT PSYCHIATRIC PROGRESS NOTE    Name:  Joy Hernandez  :  1980  MRN:  [id number]  Visit Number:  [id number]  Length of stay:  4    SUBJECTIVE  CC: polysubstance withdrawal    INTERVAL HISTORY:  Patient continues to report that she is feeling some better but continues to experience leg and stomach cramps.    Sleep: Intermittent  Cravin/10, for \"suboxone\"      Review of Systems   Constitutional: Negative.    Respiratory: Negative.    Cardiovascular: Negative.    Gastrointestinal: Positive for constipation. Negative for vomiting.   Musculoskeletal: Positive for myalgias.   Neurological: Positive for headaches.       OBJECTIVE    Temp:  [97.4 °F (36.3 °C)-98.6 °F (37 °C)] 97.4 °F (36.3 °C)  Heart Rate:  [71-97] 87  Resp:  [16-20] 16  BP: (108-120)/(67-83) 108/67    MENTAL STATUS EXAM:  Appearance: Disheveled,  but no longer appears to be in distress  Cooperation:Cooperative, poor eye contact  Psychomotor: No psychomotor agitation/retardation, No EPS, No motor tics  Speech-normal rate, amount.  Mood \"depressed\"   Affect- constricted  Thought Content-goal directed, no delusional material present  Thought process-linear, organized.  Suicidality: No SI  Homicidality: No HI  Perception: No AH/VH  Insight- poor   Judgement-fair    Lab Results (last 24 hours)     ** No results found for the last 24 hours. **             Imaging Results (last 24 hours)     ** No results found for the last 24 hours. **             ECG/EMG Results (most recent)     Procedure Component Value Units Date/Time    ECG 12 Lead [[id number]] Collected:  10/06/17 0443     Updated:  10/06/17 525    Narrative:       Test Reason : ROUTINE  Blood Pressure : **/** mmHG  Vent. Rate : 089 BPM     Atrial Rate : 089 BPM     P-R Int : 150 ms          QRS Dur : 082 ms      QT Int : 392 ms       P-R-T Axes : 050 076 065 degrees     QTc Int : 476 ms    Normal sinus rhythm  Possible Left atrial enlargement  Borderline ECG  No previous ECGs " available  Confirmed by Danae Cervantes ([id number]) on 10/6/2017 5:36:52 PM    Referred By:  ANANYA           Confirmed By:Danae Cervantes           ALLERGIES: Review of patient's allergies indicates no known allergies.      Current Facility-Administered Medications:   •  acetaminophen (TYLENOL) tablet 650 mg, 650 mg, Oral, Q4H PRN, Raquel Manzano MD, 650 mg at 10/10/17 0910  •  aluminum-magnesium hydroxide-simethicone (MAALOX MAX) 400-400-40 MG/5ML suspension 15 mL, 15 mL, Oral, Q6H PRN, Raquel Manzano MD  •  benzonatate (TESSALON) capsule 100 mg, 100 mg, Oral, TID PRN, Raquel Manzano MD  •  benztropine (COGENTIN) tablet 1 mg, 1 mg, Oral, Daily PRN **OR** benztropine (COGENTIN) injection 0.5 mg, 0.5 mg, Intramuscular, Daily PRN, Raquel Manzano MD  •  cyclobenzaprine (FLEXERIL) tablet 10 mg, 10 mg, Oral, TID PRN, Raquel Manzano MD, 10 mg at 10/10/17 0910  •  dicyclomine (BENTYL) capsule 10 mg, 10 mg, Oral, TID PRN, Raquel Manzano MD, 10 mg at 10/10/17 0910  •  docusate sodium (COLACE) capsule 100 mg, 100 mg, Oral, BID PRN, Taqueria Bolden MD  •  famotidine (PEPCID) tablet 20 mg, 20 mg, Oral, BID PRN, Raquel Manzano MD  •  hydrOXYzine (ATARAX) tablet 50 mg, 50 mg, Oral, Q6H PRN, Raquel Manzano MD, 50 mg at 10/10/17 0910  •  loperamide (IMODIUM) capsule 2 mg, 2 mg, Oral, 4x Daily PRN, Raquel Manzano MD  •  [COMPLETED] LORazepam (ATIVAN) tablet 2 mg, 2 mg, Oral, 3 times per day, 2 mg at 10/07/17 2149 **FOLLOWED BY** [COMPLETED] LORazepam (ATIVAN) tablet 1.5 mg, 1.5 mg, Oral, 3 times per day, 1.5 mg at 10/08/17 2157 **FOLLOWED BY** [COMPLETED] LORazepam (ATIVAN) tablet 1 mg, 1 mg, Oral, 3 times per day, 1 mg at 10/09/17 2149 **FOLLOWED BY** LORazepam (ATIVAN) tablet 0.5 mg, 0.5 mg, Oral, 3 times per day, Raquel Manzano MD, 0.5 mg at 10/10/17 0911  •  [] LORazepam (ATIVAN) tablet 2 mg, 2 mg, Oral, Q4H PRN **FOLLOWED BY** [] LORazepam (ATIVAN) tablet 1.5 mg, 1.5 mg, Oral, Q4H PRN **FOLLOWED BY** [] LORazepam  (ATIVAN) tablet 1 mg, 1 mg, Oral, Q4H PRN **FOLLOWED BY** LORazepam (ATIVAN) tablet 0.5 mg, 0.5 mg, Oral, Q4H PRN, Raquel Manzano MD  •  magnesium hydroxide (MILK OF MAGNESIA) suspension 2400 mg/10mL 10 mL, 10 mL, Oral, Daily PRN, Raquel Manzano MD  •  multivitamin (DAILY ISIS) tablet 1 tablet, 1 tablet, Oral, Daily, Raquel Manzano MD, 1 tablet at 10/10/17 0910  •  nicotine (NICODERM CQ) 21 MG/24HR patch 1 patch, 1 patch, Transdermal, Q24H, Raquel Manzano MD  •  nitrofurantoin (macrocrystal-monohydrate) (MACROBID) capsule 100 mg, 100 mg, Oral, Q12H, Raquel Manzano MD, 100 mg at 10/10/17 0910  •  ondansetron (ZOFRAN) tablet 4 mg, 4 mg, Oral, Q6H PRN, Raquel Manzano MD, 4 mg at 10/10/17 0910  •  rOPINIRole (REQUIP) tablet 0.25 mg, 0.25 mg, Oral, Q8H, Taqueria Bolden MD, 0.25 mg at 10/10/17 0610  •  sodium chloride (OCEAN) nasal spray 2 spray, 2 spray, Each Nare, PRN, Raquel Manzano MD  •  traZODone (DESYREL) tablet 50 mg, 50 mg, Oral, Nightly PRN, Raquel Manzano MD, 50 mg at 10/09/17 2150    ASSESSMENT & PLAN:    Active Problems:    Opioid dependence with withdrawal  Plan: Continue supportive treatment of withdrawal.  We will begin Requip 0.25 mg 3 times a day to help with the leg restlessness. Colace 100 mg twice daily as needed      Sedative, hypnotic, or anxiolytic dependence  Plan: Continue Ativan detox protocol.      Methamphetamine dependence  Plan: Monitor for withdrawal and treat supportively      Dependence on nicotine from cigarettes  Plan: Continue nicotine replacement      Post traumatic stress disorder (PTSD)  Plan: Continue monitoring and trazodone as needed for sleep      Acute cystitis without hematuria  Plan: Continue Macrobid     Plan discharge tomorrow.    Behavioral Health Treatment Plan and Problem List: I have reviewed and approved the Behavioral Health Treatment Plan and Problem list.  The patient has had a chance to review and agrees with the treatment plan.     Clinician:  Raquel Manzano,  MD  10/10/17  12:50 PM

## 2017-10-10 NOTE — PLAN OF CARE
Problem: BH Overarching Goals  Goal: Adheres to Safety Considerations for Self and Others  Outcome: Ongoing (interventions implemented as appropriate)  Goal: Optimized Coping Skills in Response to Life Stressors  Outcome: Ongoing (interventions implemented as appropriate)  Goal: Develops/Participates in Therapeutic [place] to Support Successful Transition  Outcome: Ongoing (interventions implemented as appropriate)

## 2017-10-10 NOTE — PLAN OF CARE
Problem: BH Patient Care Overview (Adult)  Goal: Plan of Care Review  Outcome: Ongoing (interventions implemented as appropriate)  Patient slept off and on, anxiety 10, depression 7, having leg cramps and cravings 7.  In bed most of the day napping and goes out to smoke.      10/10/17 7499   Coping/Psychosocial Response Interventions   Plan Of Care Reviewed With patient   Coping/Psychosocial   Patient Agreement with Plan of Care agrees   Patient Care Overview   Progress improving

## 2017-10-10 NOTE — PLAN OF CARE
Problem: BH Patient Care Overview (Adult)  Goal: Plan of Care Review  Outcome: Ongoing (interventions implemented as appropriate)    10/10/17 0431   Coping/Psychosocial Response Interventions   Plan Of Care Reviewed With patient   Coping/Psychosocial   Patient Agreement with Plan of Care agrees   Patient Care Overview   Progress no change   Outcome Evaluation   Outcome Summary/Follow up Plan Pt rates anxiety 10 depression 7. Craving 10 pt stays in bed except to go outside to smoke

## 2017-10-11 VITALS
DIASTOLIC BLOOD PRESSURE: 80 MMHG | WEIGHT: 128 LBS | HEIGHT: 64 IN | HEART RATE: 99 BPM | BODY MASS INDEX: 21.85 KG/M2 | SYSTOLIC BLOOD PRESSURE: 138 MMHG | TEMPERATURE: 98.7 F | OXYGEN SATURATION: 98 % | RESPIRATION RATE: 16 BRPM

## 2017-10-11 PROCEDURE — 99238 HOSP IP/OBS DSCHRG MGMT 30/<: CPT | Performed by: PSYCHIATRY & NEUROLOGY

## 2017-10-11 RX ORDER — HYDROXYZINE 50 MG/1
50 TABLET, FILM COATED ORAL EVERY 6 HOURS PRN
Qty: 45 TABLET | Refills: 0 | Status: SHIPPED | OUTPATIENT
Start: 2017-10-11

## 2017-10-11 RX ORDER — TRAZODONE HYDROCHLORIDE 100 MG/1
100 TABLET ORAL NIGHTLY PRN
Qty: 15 TABLET | Refills: 0 | Status: SHIPPED | OUTPATIENT
Start: 2017-10-11

## 2017-10-11 RX ORDER — NITROFURANTOIN 25; 75 MG/1; MG/1
100 CAPSULE ORAL 2 TIMES DAILY
Qty: 4 CAPSULE | Refills: 0 | Status: SHIPPED | OUTPATIENT
Start: 2017-10-11 | End: 2017-10-13

## 2017-10-11 RX ORDER — ROPINIROLE 0.25 MG/1
0.25 TABLET, FILM COATED ORAL EVERY 8 HOURS SCHEDULED
Qty: 45 TABLET | Refills: 0 | Status: SHIPPED | OUTPATIENT
Start: 2017-10-11

## 2017-10-11 RX ADMIN — ACETAMINOPHEN 650 MG: 325 TABLET ORAL at 13:22

## 2017-10-11 RX ADMIN — ACETAMINOPHEN 650 MG: 325 TABLET ORAL at 08:29

## 2017-10-11 RX ADMIN — ROPINIROLE 0.25 MG: 0.25 TABLET ORAL at 06:12

## 2017-10-11 RX ADMIN — ONDANSETRON 4 MG: 4 TABLET, FILM COATED ORAL at 08:29

## 2017-10-11 RX ADMIN — HYDROXYZINE HYDROCHLORIDE 50 MG: 50 TABLET ORAL at 08:29

## 2017-10-11 RX ADMIN — ROPINIROLE 0.25 MG: 0.25 TABLET ORAL at 14:22

## 2017-10-11 RX ADMIN — HYDROXYZINE HYDROCHLORIDE 50 MG: 50 TABLET ORAL at 14:22

## 2017-10-11 RX ADMIN — Medication 1 TABLET: at 08:29

## 2017-10-11 NOTE — PLAN OF CARE
Problem: BH Patient Care Overview (Adult)  Goal: Plan of Care Review  Outcome: Ongoing (interventions implemented as appropriate)    10/11/17 0121   Coping/Psychosocial Response Interventions   Plan Of Care Reviewed With patient   Coping/Psychosocial   Patient Agreement with Plan of Care agrees   Patient Care Overview   Progress improving   Outcome Evaluation   Outcome Summary/Follow up Plan Patient isolated in her most of shift. Rates anxiety 10/10, depression 7/10 , and cravings 10/10. C/o stomach cramps, leg cramps,, and leg aches.          Problem:  Overarching Goals  Goal: Adheres to Safety Considerations for Self and Others  Outcome: Ongoing (interventions implemented as appropriate)  Goal: Optimized Coping Skills in Response to Life Stressors  Outcome: Ongoing (interventions implemented as appropriate)  Goal: Develops/Participates in Therapeutic [place] to Support Successful Transition  Outcome: Ongoing (interventions implemented as appropriate)

## 2017-10-11 NOTE — DISCHARGE SUMMARY
"      PSYCHIATRIC DISCHARGE SUMMARY     Patient Identification:  Name:  Joy Hernandez  Age:  37 y.o.  Sex:  female  :  1980  MRN:  [id number]  Visit Number:  [id number]      Date of Admission:10/6/2017   Date of Discharge:  10/11/2017    Discharge Diagnosis:  Active Problems:    Opioid dependence with withdrawal    Sedative, hypnotic, or anxiolytic dependence    Methamphetamine dependence    Dependence on nicotine from cigarettes    Post traumatic stress disorder (PTSD)    Acute cystitis without hematuria        Admission Diagnosis:  Addiction to drug [F19.20]     Hospital Course  Patient is a 37 y.o. female presented with polysubstance use and withdrawals. The patient was admitted to the [facility] detox recovery unit for safety, further evaluation and treatment.  She was started on Subutex detox and Ativan detox regimens along with supportive and symptomatic medications including Flexeril, Vistaril and Bentyl. The patient continued to struggle with ongoing cravings and withdrawals but her condition improved over time. Requip was added to help with restlessness.  She also had a UTI and was treated with macrobid and she reported symptomatic improvement.  The patient was also able to take part in individual and group counseling sessions and work on appropriate coping skills.  The patient expressed concerns about her ability to stay sober and she was encouraged to stay away from people who use and keep her follow-up appointment. The day of discharge the patient was calm and cooperativet. Mood was reported to be anxious, and denied SI/HI/AVH. She wanted to continue taking hydroxyzine, trazodone and Requip.       Mental Status Exam upon discharge:   Mood \"anxious\"   Affect-congruent, appropriate, stable  Thought Content-goal directed, no delusional material present  Thought process-linear, organized.  Suicidality: No SI  Homicidality: No HI  Perception: No AH/VH    Procedures Performed         Consults: "   Consults     No orders found from 9/7/2017 to 10/7/2017.          Pertinent Test Results: ALT 9, WBC 14.14, Hep C reactive, UA showed large amount of leukocyte estrase, culture showed staphylococcus saprophyticus susceptible to nitrofurantoin. UDS positive for amphetamine and buprenorphine.    Condition on Discharge:  improved    Vital Signs  Temp:  [97.4 °F (36.3 °C)-98.7 °F (37.1 °C)] 98.7 °F (37.1 °C)  Heart Rate:  [66-99] 99  Resp:  [16-18] 16  BP: ()/(58-80) 138/80      Discharge Disposition:  Home or Self Care    Discharge Medications:   Joy Hernandez   Home Medication Instructions KACIE:[id number]    Printed on:10/11/17 8230   Medication Information                      hydrOXYzine (ATARAX) 50 MG tablet  Take 1 tablet by mouth Every 6 (Six) Hours As Needed for Anxiety.             nitrofurantoin, macrocrystal-monohydrate, (MACROBID) 100 MG capsule  Take 1 capsule by mouth 2 (Two) Times a Day for 2 days.             rOPINIRole (REQUIP) 0.25 MG tablet  Take 1 tablet by mouth Every 8 (Eight) Hours. Take 1 hour before bedtime.             traZODone (DESYREL) 100 MG tablet  Take 1 tablet by mouth At Night As Needed for Sleep.                 Discharge Diet: Regular    Activity at Discharge: As tolerated    Follow-up Appointments  [address] [address]  [phone]     October 16th, 2017 @ 1:45am       Clinician:   Raquel Manzano MD  10/11/17  2:48 PM

## 2017-10-11 NOTE — PROGRESS NOTES
"[id number]    Data:  Therapist met with Patient individually on this date to discuss treatment progress and discharge concerns. Patient reported that she has \"high\" depression and anxiety today due to no longer having Suboxone. Patient reported that she hoped to return to [facility] after completing detoxification but when Patient contacted them was informed that she has to comply with their probation period. Patient requested Therapist to contact [facility] this date in hopes to obtain a sooner appointment. Patient signed consent for [facility] this date.     [id number]  Therapist contacted [facility] this date and spoke with Latesha. Latesha explained to Therapist that the patient was previously incompliant with treatment and would not obtain regular appointments as well as having a positive UDS. Latesha stated that due to these factors Patient must complete a 60 day probation period and then come to their office to be a reassessment process. Latesha reported that the patient can not call not make an appointment until the probation period is complete. Latesha stated that the earliest day that the patient could call would be November 13, 2017.    Assessment:  Patient appeared to have a tearful affect and anxious mood. Patient discussed feelings of increased anxiety and dependency of Suboxone. Patient reported that she was scared to be completely tapered from Suboxone but understands that this is a consequence from her negative actions. Patient seemed goal-driven today and realistic of goals. Patient appeared hesitant of her recovery but voiced the motivation to comply with treatment and aftercare.     Plan:  Patient will follow-up at [facility] for outpatient services. Patient is scheduled an aftercare appointment for October 16, 2017 at 1:45pm. Patient denied the need for public transportation at discharge.   "